# Patient Record
Sex: FEMALE | Race: WHITE | NOT HISPANIC OR LATINO | Employment: FULL TIME | URBAN - METROPOLITAN AREA
[De-identification: names, ages, dates, MRNs, and addresses within clinical notes are randomized per-mention and may not be internally consistent; named-entity substitution may affect disease eponyms.]

---

## 2019-01-30 ENCOUNTER — OFFICE VISIT (OUTPATIENT)
Dept: URGENT CARE | Facility: CLINIC | Age: 39
End: 2019-01-30
Payer: COMMERCIAL

## 2019-01-30 VITALS
HEIGHT: 63 IN | RESPIRATION RATE: 16 BRPM | OXYGEN SATURATION: 100 % | SYSTOLIC BLOOD PRESSURE: 122 MMHG | BODY MASS INDEX: 29.23 KG/M2 | HEART RATE: 108 BPM | TEMPERATURE: 98.1 F | WEIGHT: 165 LBS | DIASTOLIC BLOOD PRESSURE: 84 MMHG

## 2019-01-30 DIAGNOSIS — R21 RASH: ICD-10-CM

## 2019-01-30 DIAGNOSIS — H61.23 BILATERAL IMPACTED CERUMEN: ICD-10-CM

## 2019-01-30 DIAGNOSIS — R59.9 SWOLLEN LYMPH NODES: ICD-10-CM

## 2019-01-30 PROCEDURE — 69210 REMOVE IMPACTED EAR WAX UNI: CPT | Performed by: NURSE PRACTITIONER

## 2019-01-30 PROCEDURE — 99203 OFFICE O/P NEW LOW 30 MIN: CPT | Mod: 25 | Performed by: NURSE PRACTITIONER

## 2019-01-30 RX ORDER — LEVOTHYROXINE SODIUM 0.07 MG/1
75 TABLET ORAL
COMMUNITY

## 2019-01-30 ASSESSMENT — ENCOUNTER SYMPTOMS
DIZZINESS: 0
VOMITING: 0
NAUSEA: 0
VERTIGO: 0
CHILLS: 0
COUGH: 0
FATIGUE: 0
SHORTNESS OF BREATH: 0
ABDOMINAL PAIN: 0
FEVER: 0
EYE PAIN: 0
WEAKNESS: 0
SORE THROAT: 0
MYALGIAS: 0
NUMBNESS: 0

## 2019-01-30 NOTE — PROGRESS NOTES
Subjective:     Stanley Coughlin is a 38 y.o. female who presents for Rash (X4 days Rash around neck/ X1 Day Bump on left side(Jaw) with anterior Neck pain)  Patient is a 38-year-old female presents clinic today for evaluation of a rash that appeared approximately 4 days in a swollen bump on the left side of her neck.  Patient denies any URI-like symptoms, no sore throat, no ear pain.  She denies any cough, nausea, vomiting.  She denies wearing it a new necklaces, perfumes, products.  Rash does not itch.      Other    This is a new problem. The current episode started yesterday. The problem occurs constantly. The problem has been unchanged. Associated symptoms include a rash (bumps on neck ). Pertinent negatives include no abdominal pain, chest pain, chills, congestion, coughing, fatigue, fever, myalgias, nausea, numbness, sore throat, vertigo, vomiting or weakness.  Nothing aggravates the symptoms. She has tried nothing for the symptoms. The treatment provided no relief.   History reviewed. No pertinent past medical history.History reviewed. No pertinent surgical history.  Social History     Social History   • Marital status:      Spouse name: N/A   • Number of children: N/A   • Years of education: N/A     Occupational History   • Not on file.     Social History Main Topics   • Smoking status: Never Smoker   • Smokeless tobacco: Never Used   • Alcohol use Not on file   • Drug use: Unknown   • Sexual activity: Not on file     Other Topics Concern   • Not on file     Social History Narrative   • No narrative on file    History reviewed. No pertinent family history. Review of Systems   Constitutional: Negative for chills, fatigue and fever.   HENT: Negative for congestion and sore throat.         Swollen lump of left side of neck , painful to touch     Eyes: Negative for pain.   Respiratory: Negative for cough and shortness of breath.    Cardiovascular: Negative for chest pain.   Gastrointestinal:  "Negative for abdominal pain, nausea and vomiting.   Genitourinary: Negative for hematuria.   Musculoskeletal: Negative for myalgias.   Skin: Positive for rash (bumps on neck ).   Neurological: Negative for dizziness, vertigo, weakness and numbness.   No Known Allergies   Objective:   /84 (BP Location: Right arm, Patient Position: Sitting, BP Cuff Size: Adult)   Pulse (!) 108   Temp 36.7 °C (98.1 °F) (Temporal)   Resp 16   Ht 1.6 m (5' 3\")   Wt 74.8 kg (165 lb)   SpO2 100%   BMI 29.23 kg/m²    Physical Exam   Constitutional: She is oriented to person, place, and time. She appears well-developed and well-nourished. No distress.   HENT:   Head: Normocephalic and atraumatic.   Right Ear: External ear and ear canal normal.   Left Ear: External ear and ear canal normal.   Nose: Nose normal. Right sinus exhibits no maxillary sinus tenderness and no frontal sinus tenderness. Left sinus exhibits no maxillary sinus tenderness and no frontal sinus tenderness.   Mouth/Throat: Uvula is midline, oropharynx is clear and moist and mucous membranes are normal. No posterior oropharyngeal edema, posterior oropharyngeal erythema or tonsillar abscesses. No tonsillar exudate.   Bilateral ears impacted with cerumen.   Eyes: Pupils are equal, round, and reactive to light. Conjunctivae and EOM are normal. Right eye exhibits no discharge. Left eye exhibits no discharge.   Cardiovascular: Normal rate and regular rhythm.    No murmur heard.  Pulmonary/Chest: Effort normal and breath sounds normal. No respiratory distress.   Abdominal: Soft. She exhibits no distension. There is no tenderness.   Lymphadenopathy:        Head (left side): Preauricular adenopathy present.     She has no cervical adenopathy.   Neurological: She is alert and oriented to person, place, and time. She has normal reflexes.   Skin: Skin is warm, dry and intact. Rash noted. Rash is pustular.        Psychiatric: She has a normal mood and affect.       "   Assessment/Plan:   Assessment    1. Swollen lymph nodes     2. Rash     3. Bilateral impacted cerumen       Patient is a 38-year-old female presents clinic today for evaluation of a lump on the left side of her neck.  It appears patient to have a swollen lymph node of unknown etiology.  There is a pimple appearing-like rash on her neck that does not appear to be infectious.  During physical exam it is noted patient had bilateral cerumen impaction in which tympanic membranes were unable to be visualized.  Procedure: Cerumen Removal  Risks and benefits of procedure discussed  Cerumen removed with curette and lavage after softening agent instilled  Patient tolerated well  Post procedure exam with clear canal and normal TM      Discussed watchful waiting with patient as suspect reactive lymph node as it is tender to palpation, with a of the neck rash.  Patient without fevers, no tonsillar exudate, no cough antibiotics not indicated at this time.Low suspicion of lesion or mass.  Discussed if symptoms not improve and/or worsen to follow-up     Patient given precautionary s/sx that mandate immediate follow up and evaluation in the ED. Advised of risks of not doing so.    DDX, Supportive care, and indications for immediate follow-up discussed with patient.    Instructed to return to clinic or nearest emergency department if we are not available for any change in condition, further concerns, or worsening of symptoms.    The patient demonstrated a good understanding and agreed with the treatment plan.